# Patient Record
Sex: MALE | URBAN - METROPOLITAN AREA
[De-identification: names, ages, dates, MRNs, and addresses within clinical notes are randomized per-mention and may not be internally consistent; named-entity substitution may affect disease eponyms.]

---

## 2018-05-22 ENCOUNTER — APPOINTMENT (RX ONLY)
Dept: URBAN - METROPOLITAN AREA CLINIC 96 | Facility: CLINIC | Age: 63
Setting detail: DERMATOLOGY
End: 2018-05-22

## 2018-05-22 DIAGNOSIS — L73.8 OTHER SPECIFIED FOLLICULAR DISORDERS: ICD-10-CM

## 2018-05-22 DIAGNOSIS — L81.4 OTHER MELANIN HYPERPIGMENTATION: ICD-10-CM

## 2018-05-22 DIAGNOSIS — L57.8 OTHER SKIN CHANGES DUE TO CHRONIC EXPOSURE TO NONIONIZING RADIATION: ICD-10-CM

## 2018-05-22 DIAGNOSIS — L82.1 OTHER SEBORRHEIC KERATOSIS: ICD-10-CM

## 2018-05-22 PROBLEM — I10 ESSENTIAL (PRIMARY) HYPERTENSION: Status: ACTIVE | Noted: 2018-05-22

## 2018-05-22 PROBLEM — E13.9 OTHER SPECIFIED DIABETES MELLITUS WITHOUT COMPLICATIONS: Status: ACTIVE | Noted: 2018-05-22

## 2018-05-22 PROBLEM — D23.11 OTHER BENIGN NEOPLASM OF SKIN OF RIGHT EYELID, INCLUDING CANTHUS: Status: ACTIVE | Noted: 2018-05-22

## 2018-05-22 PROBLEM — C44.41 BASAL CELL CARCINOMA OF SKIN OF SCALP AND NECK: Status: ACTIVE | Noted: 2018-05-22

## 2018-05-22 PROCEDURE — ? COUNSELING

## 2018-05-22 PROCEDURE — 99203 OFFICE O/P NEW LOW 30 MIN: CPT

## 2018-05-22 PROCEDURE — ? DEFER

## 2018-05-22 ASSESSMENT — LOCATION SIMPLE DESCRIPTION DERM
LOCATION SIMPLE: RIGHT EAR
LOCATION SIMPLE: LEFT ANTERIOR NECK
LOCATION SIMPLE: SCALP
LOCATION SIMPLE: RIGHT FOREHEAD
LOCATION SIMPLE: TRAPEZIAL NECK

## 2018-05-22 ASSESSMENT — LOCATION DETAILED DESCRIPTION DERM
LOCATION DETAILED: MID TRAPEZIAL NECK
LOCATION DETAILED: RIGHT CENTRAL FRONTAL SCALP
LOCATION DETAILED: LEFT INFERIOR ANTERIOR NECK
LOCATION DETAILED: RIGHT SUPERIOR FOREHEAD
LOCATION DETAILED: RIGHT SUPERIOR HELIX

## 2018-05-22 ASSESSMENT — LOCATION ZONE DERM
LOCATION ZONE: EAR
LOCATION ZONE: SCALP
LOCATION ZONE: FACE
LOCATION ZONE: NECK

## 2018-06-06 ENCOUNTER — APPOINTMENT (RX ONLY)
Dept: URBAN - METROPOLITAN AREA CLINIC 96 | Facility: CLINIC | Age: 63
Setting detail: DERMATOLOGY
End: 2018-06-06

## 2018-06-06 PROBLEM — C44.41 BASAL CELL CARCINOMA OF SKIN OF SCALP AND NECK: Status: ACTIVE | Noted: 2018-06-06

## 2018-06-06 PROCEDURE — ? MOHS SURGERY

## 2018-06-06 PROCEDURE — 17311 MOHS 1 STAGE H/N/HF/G: CPT

## 2018-06-06 PROCEDURE — ? PRESCRIPTION

## 2018-06-06 PROCEDURE — 13132 CMPLX RPR F/C/C/M/N/AX/G/H/F: CPT

## 2018-06-06 RX ORDER — IBUPROFEN 800 MG/1
TABLET, FILM COATED ORAL
Qty: 20 | Refills: 0 | Status: ERX | COMMUNITY
Start: 2018-06-06

## 2018-06-06 RX ORDER — DOXYCYCLINE 100 MG/1
TABLET, FILM COATED ORAL QD
Qty: 7 | Refills: 0 | Status: ERX | COMMUNITY
Start: 2018-06-06

## 2018-06-06 RX ADMIN — DOXYCYCLINE: 100 TABLET, FILM COATED ORAL at 12:38

## 2018-06-06 RX ADMIN — IBUPROFEN: 800 TABLET, FILM COATED ORAL at 12:38

## 2018-06-06 NOTE — PROCEDURE: MOHS SURGERY

## 2018-06-06 NOTE — PROCEDURE: MOHS SURGERY
Body Location Override (Optional - Billing Will Still Be Based On Selected Body Map Location If Applicable): Left Neck

## 2018-06-20 ENCOUNTER — APPOINTMENT (RX ONLY)
Dept: URBAN - METROPOLITAN AREA CLINIC 96 | Facility: CLINIC | Age: 63
Setting detail: DERMATOLOGY
End: 2018-06-20

## 2018-06-20 DIAGNOSIS — Z48.02 ENCOUNTER FOR REMOVAL OF SUTURES: ICD-10-CM

## 2018-06-20 PROCEDURE — ? SUTURE REMOVAL (GLOBAL PERIOD)

## 2018-06-20 PROCEDURE — 99024 POSTOP FOLLOW-UP VISIT: CPT

## 2018-06-20 ASSESSMENT — LOCATION SIMPLE DESCRIPTION DERM: LOCATION SIMPLE: NECK

## 2018-06-20 ASSESSMENT — LOCATION ZONE DERM: LOCATION ZONE: NECK

## 2018-06-20 ASSESSMENT — LOCATION DETAILED DESCRIPTION DERM: LOCATION DETAILED: LEFT SUPERIOR LATERAL NECK

## 2018-06-20 NOTE — PROCEDURE: SUTURE REMOVAL (GLOBAL PERIOD)
Add 25978 Cpt? (Important Note: In 2017 The Use Of 24460 Is Being Tracked By Cms To Determine Future Global Period Reimbursement For Global Periods): yes
Detail Level: Detailed
